# Patient Record
Sex: MALE
[De-identification: names, ages, dates, MRNs, and addresses within clinical notes are randomized per-mention and may not be internally consistent; named-entity substitution may affect disease eponyms.]

---

## 2024-09-23 ENCOUNTER — APPOINTMENT (OUTPATIENT)
Dept: ORTHOPEDIC SURGERY | Facility: CLINIC | Age: 59
End: 2024-09-23

## 2024-09-23 ENCOUNTER — APPOINTMENT (OUTPATIENT)
Dept: MRI IMAGING | Facility: CLINIC | Age: 59
End: 2024-09-23
Payer: COMMERCIAL

## 2024-09-23 VITALS — BODY MASS INDEX: 28.23 KG/M2 | WEIGHT: 220 LBS | HEIGHT: 74 IN

## 2024-09-23 DIAGNOSIS — S83.209A UNSPECIFIED TEAR OF UNSPECIFIED MENISCUS, CURRENT INJURY, UNSPECIFIED KNEE, INITIAL ENCOUNTER: ICD-10-CM

## 2024-09-23 DIAGNOSIS — Z78.9 OTHER SPECIFIED HEALTH STATUS: ICD-10-CM

## 2024-09-23 DIAGNOSIS — M23.92 UNSPECIFIED INTERNAL DERANGEMENT OF LEFT KNEE: ICD-10-CM

## 2024-09-23 PROBLEM — Z00.00 ENCOUNTER FOR PREVENTIVE HEALTH EXAMINATION: Status: ACTIVE | Noted: 2024-09-23

## 2024-09-23 PROCEDURE — 99204 OFFICE O/P NEW MOD 45 MIN: CPT | Mod: 25

## 2024-09-23 PROCEDURE — J3490M: CUSTOM

## 2024-09-23 PROCEDURE — 20611 DRAIN/INJ JOINT/BURSA W/US: CPT | Mod: LT

## 2024-09-23 PROCEDURE — 20610 DRAIN/INJ JOINT/BURSA W/O US: CPT | Mod: LT

## 2024-09-23 PROCEDURE — 73721 MRI JNT OF LWR EXTRE W/O DYE: CPT

## 2024-09-23 PROCEDURE — 73564 X-RAY EXAM KNEE 4 OR MORE: CPT | Mod: LT

## 2024-09-30 ENCOUNTER — APPOINTMENT (OUTPATIENT)
Dept: ORTHOPEDIC SURGERY | Facility: CLINIC | Age: 59
End: 2024-09-30

## 2024-09-30 ENCOUNTER — TRANSCRIPTION ENCOUNTER (OUTPATIENT)
Age: 59
End: 2024-09-30

## 2024-09-30 VITALS — HEIGHT: 74 IN | BODY MASS INDEX: 28.23 KG/M2 | WEIGHT: 220 LBS

## 2024-09-30 DIAGNOSIS — M23.92 UNSPECIFIED INTERNAL DERANGEMENT OF LEFT KNEE: ICD-10-CM

## 2024-09-30 PROCEDURE — 99214 OFFICE O/P EST MOD 30 MIN: CPT

## 2024-09-30 RX ORDER — MELOXICAM 15 MG/1
15 TABLET ORAL
Qty: 30 | Refills: 0 | Status: ACTIVE | COMMUNITY
Start: 2024-09-30 | End: 1900-01-01

## 2024-10-03 PROBLEM — M23.92 ACUTE INTERNAL DERANGEMENT OF LEFT KNEE: Status: ACTIVE | Noted: 2024-10-03

## 2024-10-03 NOTE — DISCUSSION/SUMMARY
[Medication Risks Reviewed] : Medication risks reviewed [de-identified] : The patient's condition is acute Confounding medical conditions/concerns: N/A Tests/Studies Independently Interpreted Today: xray of the L knee revealed minor degenerative change ------------------------------------------------------------------------------------------------------------------    Meniscal tear vs early arthritis upon physical exam, discussed risks of potential meniscal surgery and risks of operative and non-operative treatment of arthritis. We will obtain an MRI to see if surgery is necessary and guide future treatment. In the interim, we discussed appropriate use of NSAIDs and side effects. Recommended rehab and discussed risks and benefits of possible injection.   Due to worsening pain and instability with mechanical symptoms, recommend the patient obtain MRI L knee to rule out tear. Follow up after MRI to possibly rule out surgical pathology and discuss future treatment options.  Meniscal tear vs early arthritis upon physical exam, discussed risks of potential meniscal surgery and risks of operative and non-operative treatment of arthritis. We will obtain an MRI to see if surgery is necessary and guide future treatment. In the interim, we discussed appropriate use of NSAIDs and side effects. Recommended rehab and discussed risks and benefits of possible injection. Discussed inj for diagnostic and therapeutic purposes   Evaluated the patients left knee and discussed treatment options in the form of injection therapy. Patient elected to receive left knee CSI  today   The risks, benefits and contents of the injection have been discussed. Risks include but are not limited to allergic reaction, flare reaction, permanent white skin discoloration at the injection site and infection.  The patient understands the risks and agrees to having the injection.  All questions have been answered.   Discussed the timing of the injections and the follow up that is needed. Advised the patient to ice the area that was injected and that it may take a few days before experiencing relief.  Prescribed patient Meloxicam 15mg daily and discussed risks of side effects, as well as timing/management of medication.  Side effects can include but are not limited to gastrointestinal ulcers and irritation, kidney failure, and bleeding issues. Use as directed and take with food to manage pain, inflammation, and discomfort.  f/u in 1-2 weeks    I, Lachelle Akers, attest that this documentation has been prepared under the direction and in the presence of Provider Dr. Moustapha Carey

## 2024-10-03 NOTE — PROCEDURE
[FreeTextEntry3] :  Procedure Note: Musculoskeletal Injection Procedure: Left knee 9/2 Celestone injection   Indication: The patient has had persistent pain despite conservative treatment. Risks, benefits and alternatives to procedure were discussed; all questions were answered to the patient's apparent satisfaction and informed consent obtained. The patient denied prior problems with local anesthetics, injectable cortisones, chicken allergy, coagulopathy and no relevant drug or preservative allergies or sensitivities.   The area of injection was prepared in a sterile fashion. Prior to injection a 'Time Out' was conducted in accordance with [facility] policy and the site and nature of procedure verified with the patient.   Procedure: The procedure was carried out utilizing sterile technique from a **superolateral** arthroscopic portal position.   We didn't use U/S considering the machine was not working     0cc of clear synovial fluid was aspirated   Injection into the target area with care taken to aspirate frequently to minimize the risk of intravascular injection was performed with: ( ) 1cc of Depomedrol (80mg/ml) (X) 2cc of Betamethasone (Celestone) (6mg/ml) ( ) 1cc of Toradol (30mg/ml) (X) 9cc of 0.5% Bupivacaine ( ) 1cc of 1% Lidocaine ( ) 5cc of 32mg Zilretta, prepared and diluted per  instructions   Patient tolerated the procedure well and direct pressure was applied for hemostasis. The patient was reminded of potential post-injection risks including, but not limited to, delayed hypersensitivity reactions and/or infection. The patient verified that they had the office and the Emergency Room's contact information if any problems should arise. After several minutes, the patient informed me that they felt fine and was released from the office.

## 2024-10-03 NOTE — HISTORY OF PRESENT ILLNESS
[de-identified] : Here for injury to the left knee after slipping on hill at home. Slowly started to have increased pain and then when the booking appt the pain was intensified. Today it is not as bad. Having discomfort with flexion and intermittent discomfort with exercise.

## 2024-10-03 NOTE — HISTORY OF PRESENT ILLNESS
[de-identified] : Here for injury to the left knee after slipping on hill at home. Slowly started to have increased pain and then when the booking appt the pain was intensified. Today it is not as bad. Having discomfort with flexion and intermittent discomfort with exercise.

## 2024-10-03 NOTE — PHYSICAL EXAM
[Positive] : positive Sylvain [] : light touch is intact throughout [Left] : left knee [FreeTextEntry9] : xray of the L knee revealed minor degenerative change

## 2024-10-03 NOTE — HISTORY OF PRESENT ILLNESS
[de-identified] : Here for injury to the left knee after slipping on hill at home. Slowly started to have increased pain and then when the booking appt the pain was intensified. Today it is not as bad. Having discomfort with flexion and intermittent discomfort with exercise.

## 2024-10-03 NOTE — DISCUSSION/SUMMARY
[Medication Risks Reviewed] : Medication risks reviewed [de-identified] : The patient's condition is acute Confounding medical conditions/concerns: N/A Tests/Studies Independently Interpreted Today: xray of the L knee revealed minor degenerative change ------------------------------------------------------------------------------------------------------------------    Meniscal tear vs early arthritis upon physical exam, discussed risks of potential meniscal surgery and risks of operative and non-operative treatment of arthritis. We will obtain an MRI to see if surgery is necessary and guide future treatment. In the interim, we discussed appropriate use of NSAIDs and side effects. Recommended rehab and discussed risks and benefits of possible injection.   Due to worsening pain and instability with mechanical symptoms, recommend the patient obtain MRI L knee to rule out tear. Follow up after MRI to possibly rule out surgical pathology and discuss future treatment options.  Meniscal tear vs early arthritis upon physical exam, discussed risks of potential meniscal surgery and risks of operative and non-operative treatment of arthritis. We will obtain an MRI to see if surgery is necessary and guide future treatment. In the interim, we discussed appropriate use of NSAIDs and side effects. Recommended rehab and discussed risks and benefits of possible injection. Discussed inj for diagnostic and therapeutic purposes   Evaluated the patients left knee and discussed treatment options in the form of injection therapy. Patient elected to receive left knee CSI  today   The risks, benefits and contents of the injection have been discussed. Risks include but are not limited to allergic reaction, flare reaction, permanent white skin discoloration at the injection site and infection.  The patient understands the risks and agrees to having the injection.  All questions have been answered.   Discussed the timing of the injections and the follow up that is needed. Advised the patient to ice the area that was injected and that it may take a few days before experiencing relief.  Prescribed patient Meloxicam 15mg daily and discussed risks of side effects, as well as timing/management of medication.  Side effects can include but are not limited to gastrointestinal ulcers and irritation, kidney failure, and bleeding issues. Use as directed and take with food to manage pain, inflammation, and discomfort.  f/u in 1-2 weeks    I, Lachelle Akers, attest that this documentation has been prepared under the direction and in the presence of Provider Dr. Moustapha Carey

## 2024-10-03 NOTE — PROCEDURE
[FreeTextEntry3] :  Procedure Note: Musculoskeletal Injection Procedure: Left knee 9/2 Celestone injection   Indication: The patient has had persistent pain despite conservative treatment. Risks, benefits and alternatives to procedure were discussed; all questions were answered to the patient's apparent satisfaction and informed consent obtained. The patient denied prior problems with local anesthetics, injectable cortisones, chicken allergy, coagulopathy and no relevant drug or preservative allergies or sensitivities.   The area of injection was prepared in a sterile fashion. Prior to injection a 'Time Out' was conducted in accordance with [facility] policy and the site and nature of procedure verified with the patient.   Procedure: The procedure was carried out utilizing sterile technique from a **superolateral** arthroscopic portal position.   We didn't use U/S considering the machine was not working     0cc of clear synovial fluid was aspirated   Injection into the target area with care taken to aspirate frequently to minimize the risk of intravascular injection was performed with: ( ) 1cc of Depomedrol (80mg/ml) (X) 2cc of Betamethasone (Celestone) (6mg/ml) ( ) 1cc of Toradol (30mg/ml) (X) 9cc of 0.5% Bupivacaine ( ) 1cc of 1% Lidocaine ( ) 5cc of 32mg Zilretta, prepared and diluted per  instructions   Patient tolerated the procedure well and direct pressure was applied for hemostasis. The patient was reminded of potential post-injection risks including, but not limited to, delayed hypersensitivity reactions and/or infection. The patient verified that they had the office and the Emergency Room's contact information if any problems should arise. After several minutes, the patient informed me that they felt fine and was released from the office.

## 2024-10-03 NOTE — DISCUSSION/SUMMARY
[Medication Risks Reviewed] : Medication risks reviewed [de-identified] : The patient's condition is acute Confounding medical conditions/concerns: N/A Tests/Studies Independently Interpreted Today: xray of the L knee revealed minor degenerative change ------------------------------------------------------------------------------------------------------------------    Meniscal tear vs early arthritis upon physical exam, discussed risks of potential meniscal surgery and risks of operative and non-operative treatment of arthritis. We will obtain an MRI to see if surgery is necessary and guide future treatment. In the interim, we discussed appropriate use of NSAIDs and side effects. Recommended rehab and discussed risks and benefits of possible injection.   Due to worsening pain and instability with mechanical symptoms, recommend the patient obtain MRI L knee to rule out tear. Follow up after MRI to possibly rule out surgical pathology and discuss future treatment options.  Meniscal tear vs early arthritis upon physical exam, discussed risks of potential meniscal surgery and risks of operative and non-operative treatment of arthritis. We will obtain an MRI to see if surgery is necessary and guide future treatment. In the interim, we discussed appropriate use of NSAIDs and side effects. Recommended rehab and discussed risks and benefits of possible injection. Discussed inj for diagnostic and therapeutic purposes   Evaluated the patients left knee and discussed treatment options in the form of injection therapy. Patient elected to receive left knee CSI  today   The risks, benefits and contents of the injection have been discussed. Risks include but are not limited to allergic reaction, flare reaction, permanent white skin discoloration at the injection site and infection.  The patient understands the risks and agrees to having the injection.  All questions have been answered.   Discussed the timing of the injections and the follow up that is needed. Advised the patient to ice the area that was injected and that it may take a few days before experiencing relief.  Prescribed patient Meloxicam 15mg daily and discussed risks of side effects, as well as timing/management of medication.  Side effects can include but are not limited to gastrointestinal ulcers and irritation, kidney failure, and bleeding issues. Use as directed and take with food to manage pain, inflammation, and discomfort.  f/u in 1-2 weeks    I, Lachelle Akers, attest that this documentation has been prepared under the direction and in the presence of Provider Dr. Moustapha Carey

## 2024-10-03 NOTE — PROCEDURE
[FreeTextEntry3] :  Procedure Note: Musculoskeletal Injection Procedure: Left knee 9/2 Celestone injection   Indication: The patient has had persistent pain despite conservative treatment. Risks, benefits and alternatives to procedure were discussed; all questions were answered to the patient's apparent satisfaction and informed consent obtained. The patient denied prior problems with local anesthetics, injectable cortisones, chicken allergy, coagulopathy and no relevant drug or preservative allergies or sensitivities.   The area of injection was prepared in a sterile fashion. Prior to injection a 'Time Out' was conducted in accordance with [facility] policy and the site and nature of procedure verified with the patient.   Procedure: The procedure was carried out utilizing sterile technique from a **superolateral** arthroscopic portal position.   We didn't use U/S considering the machine was not working     0cc of clear synovial fluid was aspirated   Injection into the target area with care taken to aspirate frequently to minimize the risk of intravascular injection was performed with: ( ) 1cc of Depomedrol (80mg/ml) (X) 2cc of Betamethasone (Celestone) (6mg/ml) ( ) 1cc of Toradol (30mg/ml) (X) 9cc of 0.5% Bupivacaine ( ) 1cc of 1% Lidocaine ( ) 5cc of 32mg Zilretta, prepared and diluted per  instructions   Patient tolerated the procedure well and direct pressure was applied for hemostasis. The patient was reminded of potential post-injection risks including, but not limited to, delayed hypersensitivity reactions and/or infection. The patient verified that they had the office and the Emergency Room's contact information if any problems should arise. After several minutes, the patient informed me that they felt fine and was released from the office.

## 2024-10-13 NOTE — DISCUSSION/SUMMARY
[Medication Risks Reviewed] : Medication risks reviewed [Surgical risks reviewed] : Surgical risks reviewed [de-identified] : The patient's condition is acute Confounding medical conditions/concerns: N/A Tests/Studies Independently Interpreted Today:         MRI of the L knee revealed findings suggesting a focal soft tissue strain in the superior lateral aspect of the knee  ------------------------------------------------------------------------------------------------------------------   We reviewed the mri findings and discussed treatment options, both operative and non operative for the pt's knee. Discussed risks of potential surgery. However, due to the risks of the surgery, we will try NSAIDs and therapy. Discussed management of medication.  Pt expressed relief from diagnostic/therapeutic inj and informed him that this is indicative that he was having some arthritic related pain of the knee Discussed continued treatment options considering he is not a candidate for repeat CSI inj at this time. Discussed PT/NSAIDs.   Prescribed patient Meloxicam 15mg daily and discussed risks of side effects, as well as timing/management of medication.  Side effects can include but are not limited to gastrointestinal ulcers and irritation, kidney failure, and bleeding issues. Use as directed and take with food to manage pain, inflammation, and discomfort.   Plan for PT for tendonitis   f/u in 4 week sif pain persist   I, Lachelle Akers, attest that this documentation has been prepared under the direction and in the presence of Provider Dr. Moustapha Carey

## 2024-10-13 NOTE — DISCUSSION/SUMMARY
[Medication Risks Reviewed] : Medication risks reviewed [Surgical risks reviewed] : Surgical risks reviewed [de-identified] : The patient's condition is acute Confounding medical conditions/concerns: N/A Tests/Studies Independently Interpreted Today:         MRI of the L knee revealed findings suggesting a focal soft tissue strain in the superior lateral aspect of the knee  ------------------------------------------------------------------------------------------------------------------   We reviewed the mri findings and discussed treatment options, both operative and non operative for the pt's knee. Discussed risks of potential surgery. However, due to the risks of the surgery, we will try NSAIDs and therapy. Discussed management of medication.  Pt expressed relief from diagnostic/therapeutic inj and informed him that this is indicative that he was having some arthritic related pain of the knee Discussed continued treatment options considering he is not a candidate for repeat CSI inj at this time. Discussed PT/NSAIDs.   Prescribed patient Meloxicam 15mg daily and discussed risks of side effects, as well as timing/management of medication.  Side effects can include but are not limited to gastrointestinal ulcers and irritation, kidney failure, and bleeding issues. Use as directed and take with food to manage pain, inflammation, and discomfort.   Plan for PT for tendonitis   f/u in 4 week sif pain persist   I, Lachelle Akers, attest that this documentation has been prepared under the direction and in the presence of Provider Dr. Moustapha Carey

## 2024-10-13 NOTE — PHYSICAL EXAM
[Left] : left knee [FreeTextEntry8] : slight anterior tenderness [] : no extensor lag [FreeTextEntry9] : stable throughout ROM

## 2024-10-13 NOTE — PHYSICAL EXAM
[Left] : left knee [] : no extensor lag [FreeTextEntry8] : slight anterior tenderness [FreeTextEntry9] : stable throughout ROM

## 2024-10-13 NOTE — HISTORY OF PRESENT ILLNESS
[de-identified] : Patient is here to go over MRI Results for the LEFT KNEE. Patient states the pain level lowered with the CSI injection, but the pain returned after 3 days.

## 2024-10-13 NOTE — DISCUSSION/SUMMARY
[Medication Risks Reviewed] : Medication risks reviewed [Surgical risks reviewed] : Surgical risks reviewed [de-identified] : The patient's condition is acute Confounding medical conditions/concerns: N/A Tests/Studies Independently Interpreted Today:         MRI of the L knee revealed findings suggesting a focal soft tissue strain in the superior lateral aspect of the knee  ------------------------------------------------------------------------------------------------------------------   We reviewed the mri findings and discussed treatment options, both operative and non operative for the pt's knee. Discussed risks of potential surgery. However, due to the risks of the surgery, we will try NSAIDs and therapy. Discussed management of medication.  Pt expressed relief from diagnostic/therapeutic inj and informed him that this is indicative that he was having some arthritic related pain of the knee Discussed continued treatment options considering he is not a candidate for repeat CSI inj at this time. Discussed PT/NSAIDs.   Prescribed patient Meloxicam 15mg daily and discussed risks of side effects, as well as timing/management of medication.  Side effects can include but are not limited to gastrointestinal ulcers and irritation, kidney failure, and bleeding issues. Use as directed and take with food to manage pain, inflammation, and discomfort.   Plan for PT for tendonitis   f/u in 4 week sif pain persist   I, Lachelle Akers, attest that this documentation has been prepared under the direction and in the presence of Provider Dr. Moustapha Carey

## 2024-10-13 NOTE — HISTORY OF PRESENT ILLNESS
[de-identified] : Patient is here to go over MRI Results for the LEFT KNEE. Patient states the pain level lowered with the CSI injection, but the pain returned after 3 days.

## 2024-10-13 NOTE — HISTORY OF PRESENT ILLNESS
[de-identified] : Patient is here to go over MRI Results for the LEFT KNEE. Patient states the pain level lowered with the CSI injection, but the pain returned after 3 days.

## 2024-10-13 NOTE — DATA REVIEWED
[MRI] : MRI [Left] : left [Knee] : knee [Report was reviewed and noted in the chart] : The report was reviewed and noted in the chart [I independently reviewed and interpreted images and report] : I independently reviewed and interpreted images and report [I reviewed the films/CD] : I reviewed the films/CD [FreeTextEntry1] : MRI of the L knee revealed findings suggesting a focal soft tissue strain in the superior lateral aspect of the knee